# Patient Record
Sex: MALE | Race: WHITE | Employment: FULL TIME | URBAN - NONMETROPOLITAN AREA
[De-identification: names, ages, dates, MRNs, and addresses within clinical notes are randomized per-mention and may not be internally consistent; named-entity substitution may affect disease eponyms.]

---

## 2018-06-20 ENCOUNTER — HOSPITAL ENCOUNTER (EMERGENCY)
Age: 33
Discharge: ANOTHER HOSPITAL | End: 2018-06-20

## 2018-06-20 ENCOUNTER — HOSPITAL ENCOUNTER (INPATIENT)
Age: 33
LOS: 1 days | Discharge: HOME | DRG: 885 | End: 2018-06-21
Attending: PSYCHIATRY & NEUROLOGY | Admitting: PSYCHIATRY & NEUROLOGY

## 2018-06-20 VITALS
DIASTOLIC BLOOD PRESSURE: 81 MMHG | HEART RATE: 81 BPM | BODY MASS INDEX: 28.35 KG/M2 | TEMPERATURE: 97.9 F | HEIGHT: 70 IN | SYSTOLIC BLOOD PRESSURE: 145 MMHG | WEIGHT: 198 LBS | RESPIRATION RATE: 14 BRPM

## 2018-06-20 VITALS
TEMPERATURE: 98.1 F | RESPIRATION RATE: 30 BRPM | HEIGHT: 69 IN | DIASTOLIC BLOOD PRESSURE: 89 MMHG | SYSTOLIC BLOOD PRESSURE: 150 MMHG | OXYGEN SATURATION: 95 % | HEART RATE: 111 BPM | WEIGHT: 250 LBS | BODY MASS INDEX: 37.03 KG/M2

## 2018-06-20 DIAGNOSIS — F32.A DEPRESSION WITH SUICIDAL IDEATION: Primary | ICD-10-CM

## 2018-06-20 DIAGNOSIS — R45.851 DEPRESSION WITH SUICIDAL IDEATION: Primary | ICD-10-CM

## 2018-06-20 PROBLEM — F32.2 SEVERE MAJOR DEPRESSION WITHOUT PSYCHOTIC FEATURES (HCC): Status: ACTIVE | Noted: 2018-06-20

## 2018-06-20 PROBLEM — F32.2 SEVERE SINGLE CURRENT EPISODE OF MAJOR DEPRESSIVE DISORDER, WITHOUT PSYCHOTIC FEATURES (HCC): Status: ACTIVE | Noted: 2018-06-20

## 2018-06-20 LAB
ACETAMINOPHEN LEVEL: < 5 UG/ML (ref 0–20)
ALBUMIN SERPL-MCNC: 4.3 G/DL (ref 3.5–5.1)
ALP BLD-CCNC: 73 U/L (ref 38–126)
ALT SERPL-CCNC: 14 U/L (ref 11–66)
AMPHETAMINE SCREEN URINE: NEGATIVE
ANION GAP SERPL CALCULATED.3IONS-SCNC: 17 MEQ/L (ref 8–16)
AST SERPL-CCNC: 19 U/L (ref 5–40)
BARBITURATE SCREEN URINE: NEGATIVE
BASOPHILS # BLD: 0.6 %
BASOPHILS ABSOLUTE: 0.1 THOU/MM3 (ref 0–0.1)
BENZODIAZEPINE SCREEN, URINE: NEGATIVE
BILIRUB SERPL-MCNC: 0.2 MG/DL (ref 0.3–1.2)
BILIRUBIN DIRECT: < 0.2 MG/DL (ref 0–0.3)
BILIRUBIN URINE: NEGATIVE
BUN BLDV-MCNC: 8 MG/DL (ref 7–22)
BUPRENORPHINE URINE: ABNORMAL
CALCIUM SERPL-MCNC: 8.8 MG/DL (ref 8.5–10.5)
CANNABINOID SCREEN URINE: POSITIVE
CHLORIDE BLD-SCNC: 105 MEQ/L (ref 98–111)
CO2: 22 MEQ/L (ref 23–33)
COCAINE METABOLITE, URINE: NEGATIVE
COLOR: YELLOW
COMMENT UA: NORMAL
CREAT SERPL-MCNC: 0.7 MG/DL (ref 0.4–1.2)
EOSINOPHIL # BLD: 2.6 %
EOSINOPHILS ABSOLUTE: 0.3 THOU/MM3 (ref 0–0.4)
ETHYL ALCOHOL, SERUM: 0.06 %
GFR SERPL CREATININE-BSD FRML MDRD: > 90 ML/MIN/1.73M2
GLUCOSE BLD-MCNC: 91 MG/DL (ref 70–108)
GLUCOSE URINE: NEGATIVE
HCT VFR BLD CALC: 44.8 % (ref 42–52)
HEMOGLOBIN: 15.1 GM/DL (ref 14–18)
KETONES, URINE: NEGATIVE
LEUKOCYTE ESTERASE, URINE: NEGATIVE
LIPASE: 15.3 U/L (ref 5.6–51.3)
LYMPHOCYTES # BLD: 21.4 %
LYMPHOCYTES ABSOLUTE: 2.3 THOU/MM3 (ref 1–4.8)
MAGNESIUM: 2 MG/DL (ref 1.6–2.4)
MCH RBC QN AUTO: 30.8 PG (ref 27–31)
MCHC RBC AUTO-ENTMCNC: 33.7 GM/DL (ref 33–37)
MCV RBC AUTO: 91.3 FL (ref 80–94)
MDMA URINE: ABNORMAL
METHADONE SCREEN, URINE: NEGATIVE
METHAMPHETAMINE, URINE: ABNORMAL
MONOCYTES # BLD: 7.5 %
MONOCYTES ABSOLUTE: 0.8 THOU/MM3 (ref 0.4–1.3)
NITRITE, URINE: NEGATIVE
NUCLEATED RED BLOOD CELLS: 0 /100 WBC
OPIATES, URINE: NEGATIVE
OSMOLALITY CALCULATION: 284.8 MOSMOL/KG (ref 275–300)
OXYCODONE SCREEN URINE: NEGATIVE
PDW BLD-RTO: 12.9 % (ref 11.5–14.5)
PH UA: 6.5 (ref 5–8)
PHENCYCLIDINE, URINE: NEGATIVE
PLATELET # BLD: 222 THOU/MM3 (ref 130–400)
PMV BLD AUTO: 7.8 FL (ref 7.4–10.4)
POTASSIUM SERPL-SCNC: 4.2 MEQ/L (ref 3.5–5.2)
PROPOXYPHENE, URINE: ABNORMAL
PROTEIN UA: NEGATIVE
RBC # BLD: 4.9 MILL/MM3 (ref 4.7–6.1)
SALICYLATE, SERUM: < 0.3 MG/DL (ref 2–10)
SEG NEUTROPHILS: 67.9 %
SEGMENTED NEUTROPHILS ABSOLUTE COUNT: 7.3 THOU/MM3 (ref 1.8–7.7)
SODIUM BLD-SCNC: 144 MEQ/L (ref 135–145)
SPECIFIC GRAVITY UA: 1.01 (ref 1–1.03)
TEST INFORMATION: ABNORMAL
TOTAL PROTEIN: 6.9 G/DL (ref 6.1–8)
TRICYCLIC ANTIDEPRESSANTS, UR: ABNORMAL
TSH SERPL DL<=0.05 MIU/L-ACNC: 2.27 UIU/ML (ref 0.4–4.2)
TURBIDITY: CLEAR
URINE HGB: NEGATIVE
UROBILINOGEN, URINE: NORMAL
WBC # BLD: 10.8 THOU/MM3 (ref 4.8–10.8)

## 2018-06-20 PROCEDURE — 80307 DRUG TEST PRSMV CHEM ANLYZR: CPT

## 2018-06-20 PROCEDURE — 36415 COLL VENOUS BLD VENIPUNCTURE: CPT

## 2018-06-20 PROCEDURE — 96372 THER/PROPH/DIAG INJ SC/IM: CPT

## 2018-06-20 PROCEDURE — 81003 URINALYSIS AUTO W/O SCOPE: CPT

## 2018-06-20 PROCEDURE — 82248 BILIRUBIN DIRECT: CPT

## 2018-06-20 PROCEDURE — G0480 DRUG TEST DEF 1-7 CLASSES: HCPCS

## 2018-06-20 PROCEDURE — 80053 COMPREHEN METABOLIC PANEL: CPT

## 2018-06-20 PROCEDURE — 83690 ASSAY OF LIPASE: CPT

## 2018-06-20 PROCEDURE — 99285 EMERGENCY DEPT VISIT HI MDM: CPT

## 2018-06-20 PROCEDURE — 84443 ASSAY THYROID STIM HORMONE: CPT

## 2018-06-20 PROCEDURE — 85025 COMPLETE CBC W/AUTO DIFF WBC: CPT

## 2018-06-20 PROCEDURE — 83735 ASSAY OF MAGNESIUM: CPT

## 2018-06-20 PROCEDURE — 1240000000 HC EMOTIONAL WELLNESS R&B

## 2018-06-20 PROCEDURE — 6370000000 HC RX 637 (ALT 250 FOR IP): Performed by: REGISTERED NURSE

## 2018-06-20 PROCEDURE — 6360000002 HC RX W HCPCS

## 2018-06-20 RX ORDER — ACETAMINOPHEN 325 MG/1
650 TABLET ORAL EVERY 4 HOURS PRN
Status: DISCONTINUED | OUTPATIENT
Start: 2018-06-20 | End: 2018-06-21 | Stop reason: HOSPADM

## 2018-06-20 RX ORDER — HYDROXYZINE HYDROCHLORIDE 25 MG/1
50 TABLET, FILM COATED ORAL 3 TIMES DAILY PRN
Status: DISCONTINUED | OUTPATIENT
Start: 2018-06-20 | End: 2018-06-21 | Stop reason: HOSPADM

## 2018-06-20 RX ORDER — MAGNESIUM HYDROXIDE/ALUMINUM HYDROXICE/SIMETHICONE 120; 1200; 1200 MG/30ML; MG/30ML; MG/30ML
30 SUSPENSION ORAL EVERY 6 HOURS PRN
Status: DISCONTINUED | OUTPATIENT
Start: 2018-06-20 | End: 2018-06-21 | Stop reason: HOSPADM

## 2018-06-20 RX ORDER — ZIPRASIDONE MESYLATE 20 MG/ML
20 INJECTION, POWDER, LYOPHILIZED, FOR SOLUTION INTRAMUSCULAR ONCE
Status: COMPLETED | OUTPATIENT
Start: 2018-06-20 | End: 2018-06-20

## 2018-06-20 RX ORDER — BENZTROPINE MESYLATE 1 MG/ML
2 INJECTION INTRAMUSCULAR; INTRAVENOUS 2 TIMES DAILY PRN
Status: DISCONTINUED | OUTPATIENT
Start: 2018-06-20 | End: 2018-06-21 | Stop reason: HOSPADM

## 2018-06-20 RX ORDER — ZIPRASIDONE MESYLATE 20 MG/ML
INJECTION, POWDER, LYOPHILIZED, FOR SOLUTION INTRAMUSCULAR
Status: COMPLETED
Start: 2018-06-20 | End: 2018-06-20

## 2018-06-20 RX ORDER — TRAZODONE HYDROCHLORIDE 50 MG/1
50 TABLET ORAL NIGHTLY PRN
Status: DISCONTINUED | OUTPATIENT
Start: 2018-06-20 | End: 2018-06-21 | Stop reason: HOSPADM

## 2018-06-20 RX ORDER — NICOTINE 21 MG/24HR
1 PATCH, TRANSDERMAL 24 HOURS TRANSDERMAL DAILY
Status: DISCONTINUED | OUTPATIENT
Start: 2018-06-20 | End: 2018-06-21

## 2018-06-20 RX ADMIN — NICOTINE POLACRILEX 2 MG: 2 GUM, CHEWING BUCCAL at 18:21

## 2018-06-20 RX ADMIN — NICOTINE POLACRILEX 2 MG: 2 GUM, CHEWING BUCCAL at 21:16

## 2018-06-20 RX ADMIN — ZIPRASIDONE MESYLATE 20 MG: 20 INJECTION, POWDER, LYOPHILIZED, FOR SOLUTION INTRAMUSCULAR at 10:15

## 2018-06-20 ASSESSMENT — SLEEP AND FATIGUE QUESTIONNAIRES
DO YOU HAVE DIFFICULTY SLEEPING: NO
DO YOU HAVE DIFFICULTY SLEEPING: NO
DO YOU USE A SLEEP AID: NO
AVERAGE NUMBER OF SLEEP HOURS: 7
SLEEP PATTERN: NORMAL
DIFFICULTY ARISING: NO
DIFFICULTY STAYING ASLEEP: NO
DIFFICULTY STAYING ASLEEP: NO
DO YOU USE A SLEEP AID: NO
SLEEP PATTERN: NORMAL
AVERAGE NUMBER OF SLEEP HOURS: 4
DO YOU HAVE DIFFICULTY SLEEPING: YES
DIFFICULTY STAYING ASLEEP: YES
SLEEP PATTERN: DIFFICULTY FALLING ASLEEP;DIFFICULTY ARISING;DISTURBED/INTERRUPTED SLEEP
RESTFUL SLEEP: YES
DIFFICULTY FALLING ASLEEP: NO
DO YOU USE A SLEEP AID: NO
RESTFUL SLEEP: NO
DIFFICULTY FALLING ASLEEP: NO
AVERAGE NUMBER OF SLEEP HOURS: 7
DIFFICULTY ARISING: NO
RESTFUL SLEEP: YES
DIFFICULTY FALLING ASLEEP: NO
DIFFICULTY ARISING: NO

## 2018-06-20 ASSESSMENT — ENCOUNTER SYMPTOMS
COUGH: 0
WHEEZING: 0
CHEST TIGHTNESS: 0
RHINORRHEA: 0
SHORTNESS OF BREATH: 0
TROUBLE SWALLOWING: 0
SORE THROAT: 0

## 2018-06-20 ASSESSMENT — LIFESTYLE VARIABLES
HISTORY_ALCOHOL_USE: YES
HISTORY_ALCOHOL_USE: NO
HISTORY_ALCOHOL_USE: YES

## 2018-06-21 PROCEDURE — 6370000000 HC RX 637 (ALT 250 FOR IP): Performed by: REGISTERED NURSE

## 2018-06-21 PROCEDURE — 90792 PSYCH DIAG EVAL W/MED SRVCS: CPT | Performed by: REGISTERED NURSE

## 2018-06-21 PROCEDURE — 5130000000 HC BRIDGE APPOINTMENT

## 2018-06-21 RX ADMIN — NICOTINE POLACRILEX 2 MG: 2 GUM, CHEWING BUCCAL at 14:27

## 2018-06-21 RX ADMIN — NICOTINE POLACRILEX 2 MG: 2 GUM, CHEWING BUCCAL at 11:03

## 2019-03-31 ENCOUNTER — HOSPITAL ENCOUNTER (EMERGENCY)
Age: 34
Discharge: HOME | End: 2019-03-31
Attending: FAMILY MEDICINE

## 2019-03-31 VITALS
TEMPERATURE: 98 F | OXYGEN SATURATION: 98 % | HEIGHT: 71 IN | DIASTOLIC BLOOD PRESSURE: 90 MMHG | SYSTOLIC BLOOD PRESSURE: 130 MMHG | BODY MASS INDEX: 26.6 KG/M2 | WEIGHT: 190 LBS | HEART RATE: 100 BPM | RESPIRATION RATE: 16 BRPM

## 2019-03-31 DIAGNOSIS — R20.2 NUMBNESS AND TINGLING: Primary | ICD-10-CM

## 2019-03-31 DIAGNOSIS — R20.0 NUMBNESS AND TINGLING: Primary | ICD-10-CM

## 2019-03-31 LAB
EKG ATRIAL RATE: 115 BPM
EKG P AXIS: 70 DEGREES
EKG P-R INTERVAL: 162 MS
EKG Q-T INTERVAL: 306 MS
EKG QRS DURATION: 80 MS
EKG QTC CALCULATION (BAZETT): 423 MS
EKG R AXIS: 63 DEGREES
EKG T AXIS: 57 DEGREES
EKG VENTRICULAR RATE: 115 BPM

## 2019-03-31 PROCEDURE — 2709999900 HC NON-CHARGEABLE SUPPLY

## 2019-03-31 PROCEDURE — 93005 ELECTROCARDIOGRAM TRACING: CPT | Performed by: FAMILY MEDICINE

## 2019-03-31 PROCEDURE — 99284 EMERGENCY DEPT VISIT MOD MDM: CPT

## 2019-03-31 RX ORDER — LORAZEPAM 1 MG/1
1 TABLET ORAL ONCE
Status: DISCONTINUED | OUTPATIENT
Start: 2019-03-31 | End: 2019-03-31 | Stop reason: HOSPADM

## 2019-03-31 RX ORDER — 0.9 % SODIUM CHLORIDE 0.9 %
1000 INTRAVENOUS SOLUTION INTRAVENOUS ONCE
Status: DISCONTINUED | OUTPATIENT
Start: 2019-03-31 | End: 2019-03-31 | Stop reason: HOSPADM

## 2019-03-31 NOTE — ED PROVIDER NOTES
[facility]       Chief Complaint   Patient presents with    Numbness     right arm/hands       Nurses Notes reviewed and I agree except as noted in the HPI. HISTORY OF PRESENT ILLNESS    Olga Nayak is a 34 y.o. male who presents right arm and bi lateral hand numbness. The patient notes symptoms started about 1 week ago. Patient notes that he has been working excessive hours at work to pay for house. The patient admitted to being under a lot of stress. Currently denies chest pain. REVIEW OF SYSTEMS     Review of Systems   Neurological: Positive for numbness (right arm, bilateral hands). Psychiatric/Behavioral: Positive for agitation and behavioral problems. The patient is nervous/anxious. All other systems reviewed and are negative. PAST MEDICAL HISTORY    has a past medical history of Back pain and Depression. SURGICAL HISTORY      has a past surgical history that includes hip surgery (Bilateral) and hip surgery. CURRENT MEDICATIONS     Previous Medications    No medications on file       ALLERGIES     is allergic to pcn [penicillins]. FAMILY HISTORY     indicated that the status of his mother is unknown. He indicated that the status of his maternal grandmother is unknown. He indicated that the status of his paternal grandfather is unknown.   family history includes Heart Disease in his paternal grandfather; Other in his maternal grandmother and mother. SOCIAL HISTORY      reports that he has been smoking cigarettes. He has a 12.00 pack-year smoking history. He has never used smokeless tobacco. He reports that he drank alcohol. He reports that he has current or past drug history. Drug: Marijuana. PHYSICAL EXAM     INITIAL VITALS:  height is 5' 11\" (1.803 m) and weight is 190 lb (86.2 kg). His oral temperature is 98 °F (36.7 °C). His blood pressure is 130/90 (abnormal) and his pulse is 100.  His respiration is 16 and oxygen saturation is 98%. Physical Exam   Constitutional: He is oriented to person, place, and time. He appears well-developed and well-nourished. He appears distressed. HENT:   Head: Normocephalic and atraumatic. Eyes: Pupils are equal, round, and reactive to light. Conjunctivae and EOM are normal.   Neck: Normal range of motion. Neck supple. Cardiovascular: Regular rhythm, normal heart sounds and intact distal pulses. No murmur heard. Pulmonary/Chest: Effort normal and breath sounds normal. No respiratory distress. He has no rales. He exhibits no tenderness. Musculoskeletal: Normal range of motion. He exhibits no edema, tenderness or deformity. Neurological: He is alert and oriented to person, place, and time. Skin: Skin is dry. No rash noted. Psychiatric: His mood appears anxious. He is agitated. Nursing note and vitals reviewed. DIFFERENTIAL DIAGNOSIS:   Anxiety, acute stress response,carpal tunnel,cervical radiculopathy    DIAGNOSTIC RESULTS     EKG: All EKG's are interpreted by the Emergency Department Physician who either signs or Co-signs this chart in the absence of a cardiologist.  sinus tachycardia, no acute ST elevation, no axis deviation. RADIOLOGY: non-plain film images(s) such as CT, Ultrasound and MRI are read by the radiologist.  Plain radiographic images are visualized and preliminarilyinterpreted by the emergency physician unless otherwise stated below. LABS:   Labs Reviewed - No data to display    EMERGENCY DEPARTMENT COURSE(MDM): Vitals:    Vitals:    03/31/19 1731   BP: (!) 130/90   Pulse: 100   Resp: 16   Temp: 98 °F (36.7 °C)   TempSrc: Oral   SpO2: 98%   Weight: 190 lb (86.2 kg)   Height: 5' 11\" (1.803 m)     On arrival in room patient appeared anxious and he would not make eye contact with me. The patient noted right arm and bilateral hand numbness. He denies any recent injuries.  The patient stated that he has been under a lot of stress secondary to working a lot to pay for a house for his wife. I asked if the patient had any history of of anxiety or stress issues and he denied any psychiatric issues. The patient denied any history of issues. The patient's mother was in the room at the bedside I did ask if she was aware of any psychiatric issues and she did not answer. I did explain to the patient that I wouldn't work him up to determine if there is any physiological issues associated with his symptoms. The patient denied any recent trauma. He did note a past history of low back issues that he had seen an orthopedic doctor for. I was informed by nursing that the patient was leaving. Reentered the room. I asked him where the patient was going and he said that he was leaving. The mother and patient stated that I indicated to them that the patient was \"crazy\". I informed them that I did not state that. Nursing was in the room during those discussions and was also in the room to hear the patient and mother respond. Patient left without discharge  Or further evaluation. CRITICAL CARE:       CONSULTS:  None    PROCEDURES:  None    FINAL IMPRESSION      1. Numbness and tingling          DISPOSITION/PLAN   Eloped. PATIENT REFERRED TO:  No follow-up provider specified.     DISCHARGE MEDICATIONS:  New Prescriptions    No medications on file       (Please note that portionsof this note were completed with a voice recognition program.  Efforts were made to edit the dictations but occasionally words are mis-transcribed.)    MD Ana Spencer MD  03/31/19 Felix Sam MD  03/31/19 1037

## 2019-03-31 NOTE — ED NOTES
Pt was seen and assessed by the Dr. I walked into pt room to start IV and draw labs and his mother looked at me and said \"my son is not crazy\". I informed the pt that no one had indicated or said that he was, and that the doctor ordered a work up to check everything out on him so he can figure out what is causing your symptoms. The pt then stated \"I am not fucking crazy and the Dr. made it sound like I am just because of my history so I'm leaving, I do not want your help. \"  Dr. Kirby Doyle came back in to talk to the pt to try to get him to agree to treatment and the pt refused. The pt was starting to yell and was quickly escalating as he spoke back to Dr. Kirby Doyle and at that time we both walked out. The pt and his mother left.       Mony Lee RN  03/31/19 7293

## 2019-03-31 NOTE — ED TRIAGE NOTES
Pt presents ambulatory with complaints of his right arm being numb for a couple days and both hands have been numb for over a week. Pt denies any injury. Pt states he has been fainting approx. 3-4 times since Friday. Pt denies illness denies headaches. Pt states he had stabbing pains in his chest yesterday that come and go. Pt alert and oriented. Skin pink warm and dry.

## 2019-04-01 PROCEDURE — 93010 ELECTROCARDIOGRAM REPORT: CPT | Performed by: INTERNAL MEDICINE

## 2020-10-04 ENCOUNTER — APPOINTMENT (OUTPATIENT)
Dept: ULTRASOUND IMAGING | Age: 35
End: 2020-10-04

## 2020-10-04 ENCOUNTER — HOSPITAL ENCOUNTER (EMERGENCY)
Age: 35
Discharge: HOME | End: 2020-10-04

## 2020-10-04 VITALS
OXYGEN SATURATION: 98 % | TEMPERATURE: 97 F | RESPIRATION RATE: 16 BRPM | HEART RATE: 84 BPM | DIASTOLIC BLOOD PRESSURE: 76 MMHG | WEIGHT: 190 LBS | BODY MASS INDEX: 26.6 KG/M2 | HEIGHT: 71 IN | SYSTOLIC BLOOD PRESSURE: 128 MMHG

## 2020-10-04 LAB
ACETAMINOPHEN LEVEL: < 5 UG/ML (ref 0–20)
ALBUMIN SERPL-MCNC: 4.4 G/DL (ref 3.5–5.1)
ALP BLD-CCNC: 100 U/L (ref 38–126)
ALT SERPL-CCNC: 12 U/L (ref 11–66)
AMPHETAMINE+METHAMPHETAMINE URINE SCREEN: POSITIVE
ANION GAP SERPL CALCULATED.3IONS-SCNC: 9 MEQ/L (ref 8–16)
AST SERPL-CCNC: 11 U/L (ref 5–40)
BACTERIA: NORMAL /HPF
BARBITURATE QUANTITATIVE URINE: NEGATIVE
BASOPHILS # BLD: 0.8 %
BASOPHILS ABSOLUTE: 0.1 THOU/MM3 (ref 0–0.1)
BENZODIAZEPINE QUANTITATIVE URINE: NEGATIVE
BILIRUB SERPL-MCNC: 0.2 MG/DL (ref 0.3–1.2)
BILIRUBIN URINE: NEGATIVE
BLOOD, URINE: NEGATIVE
BUN BLDV-MCNC: 21 MG/DL (ref 7–22)
CALCIUM SERPL-MCNC: 9.9 MG/DL (ref 8.5–10.5)
CANNABINOID QUANTITATIVE URINE: NEGATIVE
CASTS 2: NORMAL /LPF
CASTS UA: NORMAL /LPF
CHARACTER, URINE: NORMAL
CHLORIDE BLD-SCNC: 100 MEQ/L (ref 98–111)
CO2: 29 MEQ/L (ref 23–33)
COCAINE METABOLITE QUANTITATIVE URINE: NEGATIVE
COLOR: NORMAL
CREAT SERPL-MCNC: 0.8 MG/DL (ref 0.4–1.2)
CRYSTALS, UA: NORMAL
EOSINOPHIL # BLD: 4.1 %
EOSINOPHILS ABSOLUTE: 0.4 THOU/MM3 (ref 0–0.4)
EPITHELIAL CELLS, UA: NORMAL /HPF
ERYTHROCYTE [DISTWIDTH] IN BLOOD BY AUTOMATED COUNT: 12.3 % (ref 11.5–14.5)
ERYTHROCYTE [DISTWIDTH] IN BLOOD BY AUTOMATED COUNT: 42.7 FL (ref 35–45)
ETHYL ALCOHOL, SERUM: < 0.01 %
GFR SERPL CREATININE-BSD FRML MDRD: > 90 ML/MIN/1.73M2
GLUCOSE BLD-MCNC: 87 MG/DL (ref 70–108)
GLUCOSE URINE: NEGATIVE MG/DL
HCT VFR BLD CALC: 44.5 % (ref 42–52)
HEMOGLOBIN: 14.4 GM/DL (ref 14–18)
IMMATURE GRANS (ABS): 0.03 THOU/MM3 (ref 0–0.07)
IMMATURE GRANULOCYTES: 0.3 %
KETONES, URINE: NEGATIVE
LEUKOCYTE ESTERASE, URINE: NEGATIVE
LIPASE: 23.1 U/L (ref 5.6–51.3)
LYMPHOCYTES # BLD: 23.2 %
LYMPHOCYTES ABSOLUTE: 2 THOU/MM3 (ref 1–4.8)
MCH RBC QN AUTO: 30.8 PG (ref 26–33)
MCHC RBC AUTO-ENTMCNC: 32.4 GM/DL (ref 32.2–35.5)
MCV RBC AUTO: 95.1 FL (ref 80–94)
MISCELLANEOUS 2: NORMAL
MONOCYTES # BLD: 8 %
MONOCYTES ABSOLUTE: 0.7 THOU/MM3 (ref 0.4–1.3)
NITRITE, URINE: NEGATIVE
NUCLEATED RED BLOOD CELLS: 0 /100 WBC
OPIATES, URINE: NEGATIVE
OSMOLALITY CALCULATION: 278 MOSMOL/KG (ref 275–300)
OXYCODONE: NEGATIVE
PH UA: 6.5 (ref 5–9)
PHENCYCLIDINE QUANTITATIVE URINE: NEGATIVE
PLATELET # BLD: 290 THOU/MM3 (ref 130–400)
PMV BLD AUTO: 9 FL (ref 9.4–12.4)
POTASSIUM REFLEX MAGNESIUM: 3.8 MEQ/L (ref 3.5–5.2)
PROTEIN UA: NEGATIVE
RBC # BLD: 4.68 MILL/MM3 (ref 4.7–6.1)
RBC URINE: NORMAL /HPF
RENAL EPITHELIAL, UA: NORMAL
SALICYLATE, SERUM: < 0.3 MG/DL (ref 2–10)
SEG NEUTROPHILS: 63.6 %
SEGMENTED NEUTROPHILS ABSOLUTE COUNT: 5.6 THOU/MM3 (ref 1.8–7.7)
SODIUM BLD-SCNC: 138 MEQ/L (ref 135–145)
SPECIFIC GRAVITY, URINE: 1.02 (ref 1–1.03)
TOTAL PROTEIN: 6.7 G/DL (ref 6.1–8)
TSH SERPL DL<=0.05 MIU/L-ACNC: 1.62 UIU/ML (ref 0.4–4.2)
UROBILINOGEN, URINE: 0.2 EU/DL (ref 0–1)
WBC # BLD: 8.8 THOU/MM3 (ref 4.8–10.8)
WBC UA: NORMAL /HPF
YEAST: NORMAL

## 2020-10-04 PROCEDURE — 99284 EMERGENCY DEPT VISIT MOD MDM: CPT

## 2020-10-04 PROCEDURE — 76870 US EXAM SCROTUM: CPT

## 2020-10-04 PROCEDURE — 84443 ASSAY THYROID STIM HORMONE: CPT

## 2020-10-04 PROCEDURE — 85025 COMPLETE CBC W/AUTO DIFF WBC: CPT

## 2020-10-04 PROCEDURE — 83690 ASSAY OF LIPASE: CPT

## 2020-10-04 PROCEDURE — 80307 DRUG TEST PRSMV CHEM ANLYZR: CPT

## 2020-10-04 PROCEDURE — 36415 COLL VENOUS BLD VENIPUNCTURE: CPT

## 2020-10-04 PROCEDURE — 76881 US COMPL JOINT R-T W/IMG: CPT

## 2020-10-04 PROCEDURE — 81001 URINALYSIS AUTO W/SCOPE: CPT

## 2020-10-04 PROCEDURE — 80053 COMPREHEN METABOLIC PANEL: CPT

## 2020-10-04 PROCEDURE — G0480 DRUG TEST DEF 1-7 CLASSES: HCPCS

## 2020-10-04 ASSESSMENT — SLEEP AND FATIGUE QUESTIONNAIRES
DIFFICULTY STAYING ASLEEP: NO
AVERAGE NUMBER OF SLEEP HOURS: 4
DIFFICULTY ARISING: NO
DO YOU HAVE DIFFICULTY SLEEPING: YES
DO YOU USE A SLEEP AID: NO
SLEEP PATTERN: DIFFICULTY FALLING ASLEEP;INSOMNIA
DIFFICULTY FALLING ASLEEP: YES
RESTFUL SLEEP: NO

## 2020-10-04 ASSESSMENT — ENCOUNTER SYMPTOMS
SHORTNESS OF BREATH: 0
VOMITING: 0
NAUSEA: 0
SORE THROAT: 0
COUGH: 0

## 2020-10-04 ASSESSMENT — PATIENT HEALTH QUESTIONNAIRE - PHQ9: SUM OF ALL RESPONSES TO PHQ QUESTIONS 1-9: 14

## 2020-10-04 ASSESSMENT — LIFESTYLE VARIABLES: HISTORY_ALCOHOL_USE: YES

## 2020-10-04 NOTE — ED NOTES
Patient presents to ED for meth detox. States last use was around 0300hrs this morning. Patient reports he consumes [place] all day every day. \"  State last drink was around midnight last night. Denies any sucidal or homicidal ideation during triage and states it has been weeks since he last felt suicidal.  Denies any pain. Shows no signs of distress. Patient placed in safe room that is ligature resistant with continuous monitoring in place. Provider notified, requested an assessment by behavioral health . Patient belongings secured in a locked lockers outside of the room. Explained suicide prevention precautions to the patient including constant observer.       Jaqueline Sanford RN  10/04/20 8278

## 2020-10-04 NOTE — ED NOTES
Upon first contact, pt resting comfortably with no concerns voiced at this time. Pt in safe room in a ligature resistant environment. Pt has continuous campus police supervision in safe room.        Anitha Teague RN  10/04/20 1932

## 2020-10-04 NOTE — PROGRESS NOTES
[address] [facility] [address] here. Spoke to patient who is fine with uncle coming back to safe room. Provider in to speak with patient at this time. [facility] left number ([phone]) to be updated on patient's care. Patient agreeable to uncle being updated. 1647 All labs completed except for urine testing (UDS and urine reflex to culture). Patient is laying on his cot. Encouraged to provide urine specimen as a route toward medical clearance. Patient voiced understanding. [id number] Patient continues to lay on cot in safe room. Encourage by Rupinder Mccormack RN, to provide urine sample. [phone] Again encourage patient to provide urine sample. Let him know cousin Rick Chuy: [phone]) called to inquire about patient. Informed patient no information could be given to her but could call her on ED's portable phone following providing a urine sample. Patient voiced understanding. Leanna Rodriguez RN, back to talk to patient. Urged him to give urine. Patient walked to bathroom.

## 2020-10-04 NOTE — ED NOTES
Patient resting quietly in cot showing no signs of distress at this time. Verbalizes no needs. States he is going to attempt to provide a urine specimen at this time. Continuous monitoring remains in place by campus police.      [facility] LongoriaFriends [facility]  10/04/20 1946

## 2020-10-04 NOTE — ED PROVIDER NOTES
[facility], [place]  eMERGENCY dEPARTMENT eNCOUnter          CHIEF COMPLAINT     No chief complaint on file. Nurses Notes reviewed and I agree except as noted inthe HPI. HISTORY OF PRESENT ILLNESS    Marvin Hubbard is a 37 y.o. male who presents to the Emergency Department for the evaluation of meth and alcohol detox. Patient states he has been using meth daily for the past 2 years. 6 months ago he was sober for 10 days due to being on probation but otherwise has been using daily. He has not sought out any medical treatment for help with this prior to today. He states that he is also been drinking daily for the past 2 years with daily alcohol use reported to be 18 beers per day. Last use of both meth and alcohol was yesterday evening. Patient states that typically on the first day without he will feel okay and his current complaint is only that he is tired. He states he lost his job 1.5 months ago, lost custody of his kids and lost power at his home to 2 to 3 weeks ago. Also, his daughter cut herself at school yesterday intentionally these have all been building motivating factors for him to quit his use. He states that he will drink a beer often first things in the morning because he feels as though he needs to but does not wake with withdrawal symptoms. He states he has blacked out from alcohol consumption and believes he has a problem. He denies any history of DTs or inpatient detox programs. The patient's uncle Adan walters who accompanies him today had mentioned to staff earlier that the patient had tried to harm himself. When asked for clarification, the uncle states that he heard through another individual that the patient was going to harm himself but does not know any details of this. Patient denies any thoughts of self-harm over the past 2 months denies saying or doing anything that might cause others to have that concern.   His only physical complaint at this time is some right is 84. His respiration is 16 and oxygen saturation is 98%. Physical Exam  Vitals signs and nursing note reviewed. Exam conducted with a chaperone present. Constitutional:       Appearance: Normal appearance. HENT:      Head: Normocephalic and atraumatic. Eyes:      Conjunctiva/sclera: Conjunctivae normal.   Cardiovascular:      Rate and Rhythm: Normal rate. Pulmonary:      Effort: Pulmonary effort is normal. No respiratory distress. Abdominal:      Palpations: Abdomen is soft. Tenderness: There is no abdominal tenderness. There is no guarding. Hernia: There is no hernia in the left inguinal area or right inguinal area. Genitourinary:     Penis: Normal. No discharge or lesions. Scrotum/Testes: Normal.      Epididymis:      Right: Normal.      Left: Normal.       Skin:     General: Skin is warm and dry. Neurological:      General: No focal deficit present. Mental Status: He is alert. Psychiatric:         Mood and Affect: Mood is depressed. Behavior: Behavior is withdrawn. Thought Content: Thought content does not include homicidal or suicidal ideation. Comments: Poorly kempt; dirty socks and oil stained hands; disheveled hair         DIFFERENTIAL DIAGNOSIS:   Differential diagnoses are discussed    DIAGNOSTIC RESULTS     EKG: All EKG's are interpreted by the Emergency Department Physician who either signs or Co-signsthis chart in the absence of a cardiologist.          RADIOLOGY: non-plain film images(s) such as CT, Ultrasound and MRI are read by the radiologist.    [address]   Final Result   1. Normal ultrasound examination of the scrotum. **This report has been created using voice recognition software. It may contain minor errors which are inherent in voice recognition technology. **      Final report electronically signed by Dr. Alexi Robbins on 10/4/2020 6:48 PM      [id number] "[employer]   Final Result   1. There is no mass within the right inguinal canal.   2. There is no pathologically enlarged lymphadenopathy within the right inguinal canal.   3. There is no right inguinal hernia. **This report has been created using voice recognition software. It may contain minor errors which are inherent in voice recognition technology. **      Final report electronically signed by Dr. Corinne Grippe on 10/4/2020 6:47 PM          LABS:      Labs Reviewed   CBC WITH AUTO DIFFERENTIAL - Abnormal; Notable for the following components:       Result Value    RBC 4.68 (*)     MCV 95.1 (*)     MPV 9.0 (*)     All other components within normal limits   COMPREHENSIVE METABOLIC PANEL W/ REFLEX TO MG FOR LOW K - Abnormal; Notable for the following components: Total Bilirubin 0.2 (*)     All other components within normal limits   SALICYLATE LEVEL - Abnormal; Notable for the following components:    Salicylate, Serum < 0.3 (*)     All other components within normal limits   LIPASE   TSH WITH REFLEX   ETHANOL   ACETAMINOPHEN LEVEL   URINE DRUG SCREEN   ANION GAP   GLOMERULAR FILTRATION RATE, ESTIMATED   OSMOLALITY   URINE WITH REFLEXED MICRO       EMERGENCY DEPARTMENT COURSE:   Vitals:    Vitals:    10/04/20 1503 10/04/20 2014   BP: 126/76 128/76   Pulse: 97 84   Resp: 16 16   Temp: 97 °F (36.1 °C)    TempSrc: Oral    SpO2: 100% 98%   Weight: 190 lb (86.2 kg)    Height: 5' 11\" (1.803 m)       4:24 PM EDT: The patient was seen and evaluated. Patient presents for complaints of desire for detox from alcohol and methamphetamines. Denies any suicidal or homicidal ideation. There is some vague concern for patient having made a suicidal statement last night but there is no concrete evidence for this and patient denies it. He is medically stable at this time. He is evaluated by behavioral access center. Psychiatry recommendation was for outpatient management or transfer to the crisis stabilization unit.   Patient was agreeable with proceeding with this and was signed out at end of shift pending potential acceptance at the [facility]. CRITICAL CARE:   None    CONSULTS:  GABBIE    PROCEDURES:  None    FINAL IMPRESSION      1. Methamphetamine abuse ([address]    2. Alcohol abuse    3. Right groin pain          DISPOSITION/PLAN   Please see follow-up provider note for final disposition    PATIENT REFERRED TO:  No follow-up provider specified.     DISCHARGEMEDICATIONS:  New Prescriptions    No medications on file       (Please note that portions of this note were completedwith a voice recognition program.  Efforts were made to edit the dictations but occasionally words are mis-transcribed.)        Yisel Logan PA-C  10/04/20 7231

## 2020-10-05 NOTE — PROGRESS NOTES
Spoke with CSU. Patient declined at this time due to alcohol use. Updated patient on POC. Updated patients medical provider and nurse. Attempted to contact patients family at [phone]. NO answer at this time. Unable to leave a VM.

## 2020-10-05 NOTE — ED NOTES
Pt resting quietly in room no needs expressed. Side rails up x2 with call light in reach. Will continue to monitor. Pt in safe room in a ligature resistant environment. Pt has continuous campus police supervision in safe room.        Juliet Vásquez RN  10/04/20 5029

## 2020-10-05 NOTE — ED NOTES
Pt resting quietly in room no needs expressed. Side rails up x1 with call light in reach. Will continue to monitor. Pt in safe room in a ligature resistant environment. Pt has continuous campus police supervision in safe room.        Aparna Ahumada RN  10/04/20 2015

## 2020-10-28 NOTE — ED PROVIDER NOTES
[address]     Pt Name: Olman Romero  MRN: [id number]  [place] 1985  Date of evaluation: 10/28/20        Mid-level provider Note:    I have personally performed and/or participated in the history, exam and medical decision making and agree with all pertinent clinical information as noted by the previous provider. I have also reviewed and agree with the past medical, family and social history unless otherwise noted. I have personally performed a face to face diagnostic evaluation on this patient. I have reviewed the previous provider's findings and agree. Evaluation:  Patient was signed out to me pending acceptance at [facility]. They declined. He is to follow up on an outpatient basis. Us Scrotum And Testicles    Result Date: 10/4/2020  PROCEDURE: US SCROTUM AND TESTICLES CLINICAL INFORMATION: Right scrotal pain. COMPARISON: No prior study. TECHNIQUE: Grayscale and color Doppler sonographic imaging of the scrotum performed in longitudinal and transverse planes. TECHNICAL DATA: Right Testicle- 3.5 x 2.2 x 4.2 cm Right Epididymis- 0.8 x 0.4 x 0.7 cm Left Testicle - 3.5 x 4.1 x 3.5 cm Left Epididymis - 0.9 x 0.7 x 0.7 cm FINDINGS: RIGHT TESTICLE: 1. The right testicle is normal size. 2. The right testicular echogenicity is normal. 3. There is no right testicular mass. 4. The color spectral Doppler evaluation of the right testicle is normal. Normal arterial inflow and venous outflow were demonstrated. 5. There is no right testicular torsion. LEFT TESTICLE: 1. The left testicle is normal size. 2. The left testicular echogenicity is normal. 3. There is no left testicular mass. 4. The color spectral Doppler evaluation of the left testicle is normal. Normal arterial inflow and venous outflow were demonstrated. 5. There is no left testicular torsion. RIGHT EPIDIDYMIS: 1. The right epididymis is normal size. 2. There is no right epididymal mass.  3. There is normal right epididymal vascularity. LEFT EPIDIDYMIS: 1. The left epididymis is normal size. 2. There is no left epididymal mass. 3. There is normal left epididymal vascularity. HYDROCELE: None. VARICOCELE: 1. Vascular structures of the left pampiniform plexus are visualized however do not meet the size criteria for varicocele. 1. Normal ultrasound examination of the scrotum. **This report has been created using voice recognition software. It may contain minor errors which are inherent in voice recognition technology. ** Final report electronically signed by Dr. Justin Godfrey on 10/4/2020 6:48 PM    Us Extremity Joint Right Non Vasc Complete    Result Date: 10/4/2020  PROCEDURE: US EXTREMITY JOINT RIGHT NON VASC COMPLETE CLINICAL INFORMATION: Right scrotal pain; assess for hernia. COMPARISON: No prior study. TECHNIQUE: Grayscale and color sonographic imaging of the right inguinal region performed in longitudinal and transverse planes. FINDINGS: There is no sonographic cystic or solid mass lesion. There is no hernia. There is a physiologic 0.8 x 0.4 cm right inguinal lymph node with retained hilar fat. 1. There is no mass within the right inguinal canal. 2. There is no pathologically enlarged lymphadenopathy within the right inguinal canal. 3. There is no right inguinal hernia. **This report has been created using voice recognition software. It may contain minor errors which are inherent in voice recognition technology. ** Final report electronically signed by Dr. Justin Godfrey on 10/4/2020 6:47 PM        DIAGNOSIS  1. Methamphetamine abuse ([facility] [address]    2. Alcohol abuse    3. Right groin pain           DISPOSITION/PLAN  PATIENT REFERRED TO:  Skye Banks  [address] [address]  [phone]    Go in 1 day  For follow up    DISCHARGE MEDICATIONS:  There are no discharge medications for this patient.         Chester Horse, APRN - CNP        Chester Horse, APRN - CNP  10/28/20 3059

## 2022-05-08 ENCOUNTER — APPOINTMENT (OUTPATIENT)
Dept: GENERAL RADIOLOGY | Age: 37
End: 2022-05-08
Payer: COMMERCIAL

## 2022-05-08 ENCOUNTER — HOSPITAL ENCOUNTER (EMERGENCY)
Age: 37
Discharge: HOME | End: 2022-05-08
Attending: FAMILY MEDICINE
Payer: COMMERCIAL

## 2022-05-08 VITALS
TEMPERATURE: 98.3 F | RESPIRATION RATE: 18 BRPM | SYSTOLIC BLOOD PRESSURE: 133 MMHG | OXYGEN SATURATION: 98 % | HEART RATE: 100 BPM | DIASTOLIC BLOOD PRESSURE: 77 MMHG

## 2022-05-08 DIAGNOSIS — R07.81 RIB PAIN ON RIGHT SIDE: Primary | ICD-10-CM

## 2022-05-08 PROCEDURE — 71101 X-RAY EXAM UNILAT RIBS/CHEST: CPT

## 2022-05-08 PROCEDURE — 99283 EMERGENCY DEPT VISIT LOW MDM: CPT

## 2022-05-08 RX ORDER — NAPROXEN 500 MG/1
500 TABLET ORAL 2 TIMES DAILY PRN
Qty: 30 TABLET | Refills: 0 | Status: SHIPPED | OUTPATIENT
Start: 2022-05-08

## 2022-05-08 ASSESSMENT — PAIN SCALES - GENERAL
PAINLEVEL_OUTOF10: 4
PAINLEVEL_OUTOF10: 4

## 2022-05-08 ASSESSMENT — ENCOUNTER SYMPTOMS
VOMITING: 0
COLOR CHANGE: 0
COUGH: 0
NAUSEA: 0
ABDOMINAL PAIN: 0
SHORTNESS OF BREATH: 0

## 2022-05-08 ASSESSMENT — PAIN - FUNCTIONAL ASSESSMENT
PAIN_FUNCTIONAL_ASSESSMENT: 0-10
PAIN_FUNCTIONAL_ASSESSMENT: 0-10

## 2022-05-08 ASSESSMENT — PAIN DESCRIPTION - ORIENTATION: ORIENTATION: RIGHT

## 2022-05-08 ASSESSMENT — PAIN DESCRIPTION - LOCATION: LOCATION: BACK;RIB CAGE

## 2022-05-08 NOTE — ED NOTES
Discharge instructions reviewed with patient and questions answered. Skin warm and dry, color normal for ethnicity. Remains alert and cooperative. Denies further questions or concerns at this time.      Bo Glover RN  05/08/22 8589

## 2022-05-08 NOTE — Clinical Note
Regan Cooper was seen and treated in our emergency department on 5/8/2022. He may return to work on 05/09/2022. If you have any questions or concerns, please don't hesitate to call.       Brisa Altamirano MD

## 2022-05-08 NOTE — ED NOTES
Right back and rib pain that started 2 days ago. Patient states that he felt a little lightheaded and leaned against a wall. Since then has had the pain. Pain increases while at work to the extent that they sent him home yesterday. Denies injury. Patient is alert and cooperative. Skin warm and dry. Color normal for ethnicity.      Gabbie Austin RN  05/08/22 7320

## 2022-05-08 NOTE — ED PROVIDER NOTES
[facility]  eMERGENCY dEPARTMENT eNCOUnter          [address] [facility]       Chief Complaint   Patient presents with    Back Pain     right    Rib Pain     right       Nurses Notes reviewed and I agree except as noted in the HPI. HISTORY OF PRESENT ILLNESS    Sheila Brannon is a 31 y.o. male who presents with right back and rib pain. Symptoms started two days ago. Patient notes he leaned up against wall after feeling a little light headed. Denies current light headiness. Denies other forms of trauma. Denies any alleviating measures. REVIEW OF SYSTEMS     Review of Systems   Constitutional: Positive for activity change. Negative for chills and fever. Respiratory: Negative for cough and shortness of breath. Cardiovascular: Positive for chest pain. Negative for palpitations. Gastrointestinal: Negative for abdominal pain, nausea and vomiting. Genitourinary: Negative for difficulty urinating, dysuria and flank pain. Musculoskeletal: Positive for arthralgias (right rib pain ). Negative for joint swelling, neck pain and neck stiffness. Skin: Negative for color change and wound. Psychiatric/Behavioral: Negative for agitation and behavioral problems. All other systems reviewed and are negative. PAST MEDICAL HISTORY    has a past medical history of Back pain and Depression. SURGICAL HISTORY      has a past surgical history that includes hip surgery (Bilateral) and hip surgery. CURRENT MEDICATIONS       Discharge Medication List as of 5/8/2022 11:59 AM          ALLERGIES     is allergic to pcn [penicillins]. FAMILY HISTORY     He indicated that the status of his mother is unknown. He indicated that the status of his maternal grandmother is unknown. He indicated that the status of his paternal grandfather is unknown.   family history includes Heart Disease in his paternal grandfather; Other in his maternal grandmother and mother.     SOCIAL HISTORY      reports that he has been smoking cigarettes. He has a 12.00 pack-year smoking history. He has never used smokeless tobacco. He reports current alcohol use. He reports current drug use. Drugs: Marijuana (Weed) and Methamphetamines (Crystal Meth). PHYSICAL EXAM     INITIAL VITALS:  temporal temperature is 98.3 °F (36.8 °C). His blood pressure is 133/77 and his pulse is 100. His respiration is 18 and oxygen saturation is 98%. Physical Exam  Vitals and nursing note reviewed. Constitutional:       General: He is not in acute distress. HENT:      Head: Atraumatic. Pulmonary:      Effort: Pulmonary effort is normal. No respiratory distress. Breath sounds: Normal breath sounds. No wheezing. Chest:      Chest wall: No tenderness. Musculoskeletal:         General: Tenderness present. No swelling, deformity or signs of injury. Skin:     General: Skin is dry. Findings: No bruising or rash. Neurological:      General: No focal deficit present. Mental Status: He is alert and oriented to person, place, and time. DIFFERENTIAL DIAGNOSIS:   Rib strain,rib fracture,    DIAGNOSTIC RESULTS         RADIOLOGY: non-plain film images(s) such as CT, Ultrasound and MRI are read by the radiologist.  PROCEDURE: XR RIBS RIGHT INCLUDE CHEST (MIN 3 VIEWS)       CLINICAL INFORMATION: Right posterior rib pain       TECHNIQUE: PA chest radiograph and 4 views of the right ribs       COMPARISON: None       FINDINGS:        Chest: Cardiomediastinal silhouette is within normal limits. Lungs are clear. There is no pneumothorax. There is slight levoconvex curvature of the midthoracic spine. Degenerative changes in the thoracic spine are poorly visualized. Soft tissues are    unremarkable.       Right ribs: There is no displaced right rib fracture.           Impression   1. No acute intrathoracic process.    2. No right rib fracture.       Final report electronically signed by Dr. Briana Rogel on 5/8/2022 11:54 AM LABS:   Labs Reviewed - No data to display    EMERGENCY DEPARTMENT COURSE:   Vitals:    Vitals:    05/08/22 1126   BP: 133/77   Pulse: 100   Resp: 18   Temp: 98.3 °F (36.8 °C)   TempSrc: Temporal   SpO2: 98%     Well-appearing vital signs are stable. Seems to display right lateral rib pain mostly in the mid scapular area line. No crepitus. Lungs are clear. Neck is supple without any spinous process tenderness. X-rays of the right ribs and PA chest are negative for fracture. Otherwise clear lungs. We will provide anti-inflammatories for pain recommended further follow-up with PCP or ED as needed. Care instructions were provided. PROCEDURES:  None    FINAL IMPRESSION      1. Rib pain on right side          DISPOSITION/PLAN   Home. Follow up with PCP or ED as needed. PATIENT REFERRED TO:  No follow-up provider specified.     DISCHARGE MEDICATIONS:  Discharge Medication List as of 5/8/2022 11:59 AM      START taking these medications    Details   naproxen (NAPROSYN) 500 MG tablet Take 1 tablet by mouth 2 times daily as needed for Pain, Disp-30 tablet, R-0Normal             (Please note that portions of this note were completed with a voice recognition program.  Efforts were made to edit the dictations but occasionally words are mis-transcribed.)    MD Shaylee Worley MD  05/08/22 8732

## 2023-04-16 ENCOUNTER — APPOINTMENT (OUTPATIENT)
Dept: GENERAL RADIOLOGY | Age: 38
End: 2023-04-16
Payer: COMMERCIAL

## 2023-04-16 ENCOUNTER — HOSPITAL ENCOUNTER (EMERGENCY)
Age: 38
Discharge: HOME | End: 2023-04-16
Attending: FAMILY MEDICINE
Payer: COMMERCIAL

## 2023-04-16 VITALS
TEMPERATURE: 97.9 F | RESPIRATION RATE: 16 BRPM | OXYGEN SATURATION: 99 % | BODY MASS INDEX: 28.63 KG/M2 | DIASTOLIC BLOOD PRESSURE: 84 MMHG | HEART RATE: 102 BPM | HEIGHT: 70 IN | SYSTOLIC BLOOD PRESSURE: 127 MMHG | WEIGHT: 200 LBS

## 2023-04-16 DIAGNOSIS — S93.144A: ICD-10-CM

## 2023-04-16 DIAGNOSIS — R22.41 LOCALIZED SWELLING OF RIGHT LOWER LEG: Primary | ICD-10-CM

## 2023-04-16 PROCEDURE — 73630 X-RAY EXAM OF FOOT: CPT

## 2023-04-16 PROCEDURE — 73610 X-RAY EXAM OF ANKLE: CPT

## 2023-04-16 PROCEDURE — 99283 EMERGENCY DEPT VISIT LOW MDM: CPT

## 2023-04-16 ASSESSMENT — ENCOUNTER SYMPTOMS
VOMITING: 0
COLOR CHANGE: 0
NAUSEA: 0

## 2023-04-16 ASSESSMENT — LIFESTYLE VARIABLES: HOW OFTEN DO YOU HAVE A DRINK CONTAINING ALCOHOL: NEVER

## 2023-04-16 ASSESSMENT — PAIN - FUNCTIONAL ASSESSMENT
PAIN_FUNCTIONAL_ASSESSMENT: NONE - DENIES PAIN
PAIN_FUNCTIONAL_ASSESSMENT: NONE - DENIES PAIN

## 2023-04-17 ENCOUNTER — HOSPITAL ENCOUNTER (OUTPATIENT)
Dept: INTERVENTIONAL RADIOLOGY/VASCULAR | Age: 38
Discharge: HOME | End: 2023-04-17
Payer: COMMERCIAL

## 2023-04-17 ENCOUNTER — HOSPITAL ENCOUNTER (OUTPATIENT)
Age: 38
Discharge: HOME | End: 2023-04-17
Payer: COMMERCIAL

## 2023-04-17 DIAGNOSIS — R60.9 SWELLING: ICD-10-CM

## 2023-04-17 PROCEDURE — 93971 EXTREMITY STUDY: CPT

## 2023-04-17 NOTE — ED NOTES
Received a call from [facility] of [address]. She stated they were off at 1000 pm. [facility] notified.       Reginaldo Gray RN  04/16/23 1671

## 2023-04-17 NOTE — ED NOTES
Patient observed resp easy, sitting on the side of the bed. Patient educated on going to [facility] for [address], and follow up with podiatry. Patient verbalized understanding, questions answered. Patient observed steadily ambulate from the department after discharge, with family.       Blanco Hernandez RN  04/16/23 [id number]

## 2023-04-17 NOTE — ED NOTES
Dr. Gianfranco Brannon at bedside with the patient. Patient declined CT of right foot.      Kade Castillo RN  04/16/23 9262

## 2023-04-17 NOTE — ED TRIAGE NOTES
Patient reported, \"right foot swelling and changing color. I didn't hurt it or nothing. It started on Friday, not getting any better. \" Observed patient resp easy, right foot swollen all toes red, with discoloration at right ankle. Patient placed in bed leg up.

## 2023-04-17 NOTE — ED PROVIDER NOTES
body.               RIGHT ANKLE X-RAYS       COMPARISON: None. FINDINGS: A total of 3 images of the right ankle were obtained. Exam is    limited due to patient positioning. There is no definite acute fracture or    dislocation. Soft tissue swelling is noted about the right ankle. Impression   1. No acute fracture or dislocation. 2. Soft tissue swelling is noted. This document has been electronically signed by: Shanna Macario M.D. on    04/16/2023 11:26 PM       LABS:   Labs Reviewed - No data to display    EMERGENCY DEPARTMENT COURSE:   Vitals:    Vitals:    04/16/23 2127 04/16/23 2245   BP: 135/80 127/84   Pulse: (!) 117 (!) 102   Resp: 16 16   Temp: 97.9 °F (36.6 °C)    SpO2: 98% 99%   Weight: 200 lb (90.7 kg)    Height: 5' 10\" (1.778 m)      Right lower leg swelling when compared to left leg. No history of trauma. Large callus formation both lower feet. Some erythema noted in medial aspect of left ankle. Cannot exclude cellulitis. Cannot exclude DVT. Will obtain US right lower leg. Pulses intact right lower leg. No coolness to right lower leg. X rays of right foot and ankle. Foot x ray shows Subluxation of the second and third MTP joints. X ray of right ankle fails to show fracture or dislocation. The patient states that the symptoms or right toe derangement has been ongoing for at least 7 months. I did offer further CT scanning of the right foot for better visualization however he declined at this time. I will set the patient up for a ultrasound of his right lower leg tomorrow to rule out DVT. I will set this patient up for further evaluation by podiatry. PROCEDURES:  None    FINAL IMPRESSION      1. Localized swelling of right lower leg    2. Subluxation of MTP joint of right lesser toe(s), init          DISPOSITION/PLAN   Home. Follow up [address] for ultrasound tomorrow. Schedule appointment with Podiatry for evaluation of chronic right toe subluxation.      PATIENT Yes

## 2025-01-22 ENCOUNTER — HOSPITAL ENCOUNTER (EMERGENCY)
Age: 40
Discharge: HOME | End: 2025-01-22
Attending: FAMILY MEDICINE

## 2025-01-22 VITALS
RESPIRATION RATE: 18 BRPM | OXYGEN SATURATION: 100 % | TEMPERATURE: 97.6 F | DIASTOLIC BLOOD PRESSURE: 94 MMHG | HEIGHT: 70 IN | HEART RATE: 104 BPM | SYSTOLIC BLOOD PRESSURE: 161 MMHG | BODY MASS INDEX: 27.92 KG/M2 | WEIGHT: 195 LBS

## 2025-01-22 DIAGNOSIS — S51.012A ELBOW LACERATION, LEFT, INITIAL ENCOUNTER: Primary | ICD-10-CM

## 2025-01-22 PROCEDURE — 99284 EMERGENCY DEPT VISIT MOD MDM: CPT

## 2025-01-22 PROCEDURE — 90715 TDAP VACCINE 7 YRS/> IM: CPT | Performed by: FAMILY MEDICINE

## 2025-01-22 PROCEDURE — 12001 RPR S/N/AX/GEN/TRNK 2.5CM/<: CPT

## 2025-01-22 PROCEDURE — 6360000002 HC RX W HCPCS: Performed by: FAMILY MEDICINE

## 2025-01-22 PROCEDURE — 90471 IMMUNIZATION ADMIN: CPT | Performed by: FAMILY MEDICINE

## 2025-01-22 RX ORDER — LIDOCAINE HYDROCHLORIDE 10 MG/ML
5 INJECTION, SOLUTION INFILTRATION; PERINEURAL ONCE
Status: COMPLETED | OUTPATIENT
Start: 2025-01-22 | End: 2025-01-22

## 2025-01-22 RX ADMIN — LIDOCAINE HYDROCHLORIDE 5 ML: 10 INJECTION, SOLUTION INFILTRATION; PERINEURAL at 09:10

## 2025-01-22 RX ADMIN — TETANUS TOXOID, REDUCED DIPHTHERIA TOXOID AND ACELLULAR PERTUSSIS VACCINE, ADSORBED 0.5 ML: 5; 2.5; 8; 8; 2.5 SUSPENSION INTRAMUSCULAR at 09:23

## 2025-01-22 ASSESSMENT — PAIN - FUNCTIONAL ASSESSMENT: PAIN_FUNCTIONAL_ASSESSMENT: NONE - DENIES PAIN
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
## 2025-01-22 NOTE — ED NOTES
Discharge teaching and instructions for condition explained to patient.  AVS reviewed.  Patient voiced understanding regarding follow up appointments and care of self at home.  Pt discharged to home in stable condition per self with a ride home from [facility].
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
## 2025-01-22 NOTE — DISCHARGE INSTRUCTIONS
Juan Rdz,    It has been my absolute pleasure to serve you while in the emergency department at [facility] today.  Please do remember to take all medications as prescribed.  Please make sure to keep the area clean and dry.  You can bathe as usual but make sure you dry the area thoroughly.  If you start noticing any redness around the area, significant bleeding that is soaking through the bandage, significant drainage that looks like pus, fevers etc. please make sure you return to the ER.  Please follow-up for suture removal in 7 days either to urgent care or the emergency department or PCP.  Please make sure you follow-up with your PCP within 7 days.  Please follow-up with any and all specialists as we discussed.  Please return to the ER in case of any worsening of symptoms.  I wish you a speedy recovery!    Sincerely,    Dr. Evans Cat MD.